# Patient Record
Sex: MALE | Race: WHITE | Employment: FULL TIME | URBAN - METROPOLITAN AREA
[De-identification: names, ages, dates, MRNs, and addresses within clinical notes are randomized per-mention and may not be internally consistent; named-entity substitution may affect disease eponyms.]

---

## 2021-07-01 ENCOUNTER — HOSPITAL ENCOUNTER (EMERGENCY)
Age: 21
Discharge: HOME | End: 2021-07-01
Attending: EMERGENCY MEDICINE
Payer: COMMERCIAL

## 2021-07-01 VITALS
RESPIRATION RATE: 18 BRPM | OXYGEN SATURATION: 98 % | DIASTOLIC BLOOD PRESSURE: 78 MMHG | HEART RATE: 78 BPM | SYSTOLIC BLOOD PRESSURE: 136 MMHG | TEMPERATURE: 97.4 F

## 2021-07-01 DIAGNOSIS — T30.0 BURN: Primary | ICD-10-CM

## 2021-07-01 PROCEDURE — 90471 IMMUNIZATION ADMIN: CPT | Performed by: STUDENT IN AN ORGANIZED HEALTH CARE EDUCATION/TRAINING PROGRAM

## 2021-07-01 PROCEDURE — 99282 EMERGENCY DEPT VISIT SF MDM: CPT

## 2021-07-01 PROCEDURE — 6360000002 HC RX W HCPCS: Performed by: STUDENT IN AN ORGANIZED HEALTH CARE EDUCATION/TRAINING PROGRAM

## 2021-07-01 PROCEDURE — 90715 TDAP VACCINE 7 YRS/> IM: CPT | Performed by: STUDENT IN AN ORGANIZED HEALTH CARE EDUCATION/TRAINING PROGRAM

## 2021-07-01 RX ORDER — BACITRACIN, NEOMYCIN, POLYMYXIN B 400; 3.5; 5 [USP'U]/G; MG/G; [USP'U]/G
OINTMENT TOPICAL
Qty: 1 TUBE | Refills: 0 | Status: SHIPPED | OUTPATIENT
Start: 2021-07-01 | End: 2021-07-11

## 2021-07-01 RX ORDER — BACITRACIN, NEOMYCIN, POLYMYXIN B 400; 3.5; 5 [USP'U]/G; MG/G; [USP'U]/G
OINTMENT TOPICAL
Qty: 1 TUBE | Refills: 0 | Status: SHIPPED | OUTPATIENT
Start: 2021-07-01 | End: 2021-07-01 | Stop reason: SDUPTHER

## 2021-07-01 RX ADMIN — TETANUS TOXOID, REDUCED DIPHTHERIA TOXOID AND ACELLULAR PERTUSSIS VACCINE, ADSORBED 0.5 ML: 5; 2.5; 8; 8; 2.5 SUSPENSION INTRAMUSCULAR at 21:36

## 2021-07-01 ASSESSMENT — ENCOUNTER SYMPTOMS: ROS SKIN COMMENTS: BURN
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
## 2021-07-25 ENCOUNTER — HOSPITAL ENCOUNTER (EMERGENCY)
Age: 21
Discharge: HOME | End: 2021-07-26
Attending: EMERGENCY MEDICINE
Payer: COMMERCIAL

## 2021-07-25 ENCOUNTER — APPOINTMENT (OUTPATIENT)
Dept: GENERAL RADIOLOGY | Age: 21
End: 2021-07-25
Payer: COMMERCIAL

## 2021-07-25 VITALS
SYSTOLIC BLOOD PRESSURE: 126 MMHG | HEART RATE: 93 BPM | HEIGHT: 68 IN | OXYGEN SATURATION: 98 % | TEMPERATURE: 99.1 F | BODY MASS INDEX: 23.49 KG/M2 | DIASTOLIC BLOOD PRESSURE: 67 MMHG | RESPIRATION RATE: 15 BRPM | WEIGHT: 155 LBS

## 2021-07-25 DIAGNOSIS — B34.9 VIRAL ILLNESS: Primary | ICD-10-CM

## 2021-07-25 LAB
SARS-COV-2, RAPID: NOT DETECTED
SPECIMEN DESCRIPTION: NORMAL

## 2021-07-25 PROCEDURE — 6370000000 HC RX 637 (ALT 250 FOR IP): Performed by: EMERGENCY MEDICINE

## 2021-07-25 PROCEDURE — 87635 SARS-COV-2 COVID-19 AMP PRB: CPT

## 2021-07-25 PROCEDURE — 70360 X-RAY EXAM OF NECK: CPT

## 2021-07-25 PROCEDURE — 71046 X-RAY EXAM CHEST 2 VIEWS: CPT

## 2021-07-25 PROCEDURE — 99285 EMERGENCY DEPT VISIT HI MDM: CPT

## 2021-07-25 RX ORDER — BENZONATATE 100 MG/1
100 CAPSULE ORAL ONCE
Status: COMPLETED | OUTPATIENT
Start: 2021-07-25 | End: 2021-07-26

## 2021-07-25 RX ORDER — IBUPROFEN 800 MG/1
800 TABLET ORAL EVERY 8 HOURS PRN
Qty: 21 TABLET | Refills: 0 | Status: SHIPPED | OUTPATIENT
Start: 2021-07-25

## 2021-07-25 RX ORDER — BENZONATATE 100 MG/1
100 CAPSULE ORAL 3 TIMES DAILY PRN
Qty: 21 CAPSULE | Refills: 0 | Status: SHIPPED | OUTPATIENT
Start: 2021-07-25 | End: 2021-08-01

## 2021-07-25 RX ORDER — IBUPROFEN 800 MG/1
800 TABLET ORAL ONCE
Status: COMPLETED | OUTPATIENT
Start: 2021-07-25 | End: 2021-07-25

## 2021-07-25 RX ADMIN — IBUPROFEN 800 MG: 800 TABLET, FILM COATED ORAL at 22:08

## 2021-07-25 ASSESSMENT — PAIN SCALES - GENERAL
PAINLEVEL_OUTOF10: 6

## 2021-07-25 ASSESSMENT — PAIN DESCRIPTION - LOCATION: LOCATION: CHEST

## 2021-07-25 ASSESSMENT — ENCOUNTER SYMPTOMS
RHINORRHEA: 1
VOMITING: 0
NAUSEA: 0
SHORTNESS OF BREATH: 1
SORE THROAT: 1
COUGH: 1
CONSTIPATION: 0
ABDOMINAL PAIN: 0
BACK PAIN: 0
DIARRHEA: 0
PHOTOPHOBIA: 0

## 2021-07-25 ASSESSMENT — PAIN DESCRIPTION - FREQUENCY: FREQUENCY: INTERMITTENT

## 2021-07-25 ASSESSMENT — PAIN DESCRIPTION - DESCRIPTORS: DESCRIPTORS: OTHER (COMMENT)

## 2021-07-26 PROCEDURE — 6370000000 HC RX 637 (ALT 250 FOR IP): Performed by: EMERGENCY MEDICINE

## 2021-07-26 RX ADMIN — BENZONATATE 100 MG: 100 CAPSULE ORAL at 00:06

## 2021-07-26 NOTE — ED NOTES
Pt given written and verbal discharge, f/u instructions with instructions to p/u medications at preferred pharmacy. Pt verbalizes understanding. Pt is ambulatory with steady gait.       Henriette Ormond, RN  07/26/21 0006

## 2021-07-26 NOTE — ED TRIAGE NOTES
Pt reports x 5 days ago, runny nose, with unproductive cough and low grade temp. . Pt reports nausea, no vomiting. Pt reports chills.

## 2021-07-26 NOTE — ED PROVIDER NOTES
[id number] [facility] ED  [address] [facility] [address]  Phone: [phone]  Fax: [phone]        Pt Name: Tena Bloom  MRN: [id number]  [place] 2000  Date of evaluation: 7/25/21      CHIEF COMPLAINT     Chief Complaint   Patient presents with    Cough     x 5 days    Fever     x 5 days          HISTORY OF PRESENT ILLNESS  (Location/Symptom, Timing/Onset, Context/Setting, Quality, Duration, Modifying Factors, Severity.)    Tena Bloom is a 24 y.o. male who presents with cough, shortness of breath, nasal congestion, sore throat, and headache. He has been sick since Wednesday. He states he just got back from vacation in [place]. He complains of pain with eating and drinking. The patient has no prior history of venous thromboembolism. No recent surgery. No leg swelling. No hemoptysis. No estrogen containing medications. No history of malignancy. He did not get the Covid-19 vaccine. No fever. He had chills Wednesday through Friday. He states he feels like he is slightly better. REVIEW OF SYSTEMS    (2-9 systems for level 4, 10 or more for level 5)     Review of Systems   Constitutional: Positive for chills. Negative for fever. HENT: Positive for congestion, rhinorrhea and sore throat. Eyes: Negative for photophobia and visual disturbance. Respiratory: Positive for cough and shortness of breath. Cardiovascular: Negative for chest pain and palpitations. Gastrointestinal: Negative for abdominal pain, constipation, diarrhea, nausea and vomiting. Genitourinary: Negative for dysuria, frequency and urgency. Musculoskeletal: Positive for neck pain. Negative for back pain. Skin: Negative for rash and wound. Neurological: Negative for dizziness and light-headedness. Hematological: Negative for adenopathy. Does not bruise/bleed easily. PAST MEDICAL HISTORY    has a past medical history of Gastroptosis.     SURGICAL HISTORY      has no past surgical history on file. CURRENTMEDICATIONS       Previous Medications    No medications on file       ALLERGIES     has No Known Allergies. FAMILY HISTORY     has no family status information on file. family history is not on file. SOCIAL HISTORY      reports that he has never smoked. He has never used smokeless tobacco. He reports previous alcohol use. He reports that he does not use drugs. PHYSICAL EXAM    (up to 7 for level 4, 8 or more for level 5)   INITIAL VITALS:  height is 5' 8\" (1.727 m) and weight is 70.3 kg (155 lb). His oral temperature is 99.1 °F (37.3 °C). His blood pressure is 126/67 and his pulse is 93. His respiration is 15 and oxygen saturation is 98%. Physical Exam  Vitals and nursing note reviewed. Constitutional:       General: He is not in acute distress. Appearance: Normal appearance. He is not ill-appearing or toxic-appearing. HENT:      Head: Normocephalic and atraumatic. Right Ear: Tympanic membrane, ear canal and external ear normal.      Left Ear: Tympanic membrane, ear canal and external ear normal.      Nose: Congestion present. Mouth/Throat:      Mouth: Mucous membranes are moist.      Pharynx: No oropharyngeal exudate or posterior oropharyngeal erythema. Eyes:      Extraocular Movements: Extraocular movements intact. Pupils: Pupils are equal, round, and reactive to light. Neck:      Comments: No crepitus  Cardiovascular:      Rate and Rhythm: Normal rate and regular rhythm. Pulses: Normal pulses. Heart sounds: Normal heart sounds. No murmur heard. No friction rub. No gallop. Pulmonary:      Effort: Pulmonary effort is normal.      Breath sounds: Normal breath sounds. No wheezing, rhonchi or rales. Abdominal:      General: Abdomen is flat. Bowel sounds are normal.      Palpations: Abdomen is soft. Tenderness: There is no abdominal tenderness. There is no guarding or rebound.    Musculoskeletal:         General: Normal range of motion. Cervical back: Normal range of motion and neck supple. No rigidity or tenderness. Right lower leg: No edema. Left lower leg: No edema. Lymphadenopathy:      Cervical: No cervical adenopathy. Skin:     General: Skin is warm and dry. Capillary Refill: Capillary refill takes less than 2 seconds. Neurological:      Mental Status: He is alert and oriented to person, place, and time. Mental status is at baseline. Psychiatric:         Mood and Affect: Mood normal.         Behavior: Behavior normal.         Thought Content: Thought content normal.         DIFFERENTIAL DIAGNOSIS/ MDM:     The patient presents with viral symptoms including cough and sore throat. However at this time, all vital signs are stable and the patient is not hypoxic or tachypneic. Presenting  symptoms are currently mild and not life, limb or organ threatening. The patient presents with upper respiratory symptoms that are not suggestive in nature of pulmonary embolus, cardiac ischemia, meningitis, epiglottis, airway obstruction or other serious etiology. Given the extremely low risk of these diagnoses further testing and evaluation for these possibilites are not indicated at this time. In my opinion, the patient currently does not have an emergency medical condition as defined by EMTALA. I have stressed to the patient that while their current symptoms may not be severe to them, they are highly contagious and should self quarantine for at least 10 days from the onset of symptoms or 72 hours after resolution of their fever without any antipyretic medications (whichever is shorter). Isolation strategies have been discussed and reinforced to protect their friends and families. I have answered questions asked to the patients satisfaction.  They have been instructed to immediately return to the emergency department if symptoms worsen including but not limited to shortness of breath, chest pain, a feeling of passing out,light headed or dizziness, any neurologic symptoms, abdominal pain, or persistent nausea, vomiting and/or diarrhea. I have stressed the importance of close followup with their primary care physicians via telemedicine. The patient has voiced understanding of these instructions and does not offer any further questions or complaints. The patient understands that at this time there is no evidence for a more malignant underlying process, but the patient also understands that early in the process of an illness or injury, an emergency department workup can be falsely reassuring. Routine discharge counseling was given, and the patient understands that worsening, changing or persistent symptoms should prompt an immediate call or follow up with their primary physician or return to the emergency department. The importance of appropriate follow up was also discussed. I have reviewed the disposition diagnosis with the patient and or their family/guardian. I have answered their questions and given discharge instructions. They voiced understanding of these instructions and did not have any further questions or complaints. DIAGNOSTIC RESULTS     EKG: All EKG's are interpreted by the Emergency Department Physician who either signs or Co-signs this chart in the absence of a cardiologist.    none    RADIOLOGY:        Interpretation per the Radiologist below, if available at the time of this note:    XR NECK SOFT TISSUE    Result Date: 7/25/2021  EXAMINATION: [address] TISSUES 7/25/2021 10:47 pm COMPARISON: None. HISTORY: ORDERING SYSTEM PROVIDED HISTORY: neck pain TECHNOLOGIST PROVIDED HISTORY: neck pain Reason for Exam: Neck pain/ cough x 5 days no hx/sx Acuity: Acute Type of Exam: Initial FINDINGS: No prevertebral swelling. Normal epiglottis. Normal tracheal air column. No radiodense foreign body.      Normal soft tissue neck     XR CHEST (2 VW)    Result Date: 7/25/2021  EXAMINATION: TWO XRAY VIEWS OF THE CHEST 7/25/2021 10:47 pm COMPARISON: None. HISTORY: ORDERING SYSTEM PROVIDED HISTORY: cough TECHNOLOGIST PROVIDED HISTORY: cough Reason for Exam: Neck pain/ cough x 5 days no hx/sx Acuity: Acute Type of Exam: Initial FINDINGS: The lungs are without acute focal process. There is no effusion or pneumothorax. The cardiomediastinal silhouette is without acute process. The osseous structures are without acute process. No acute process. LABS:  Results for orders placed or performed during the hospital encounter of 07/25/21   COVID-19, Rapid    Specimen: Nasopharyngeal Swab   Result Value Ref Range    Specimen Description . NASOPHARYNGEAL SWAB     SARS-CoV-2, Rapid Not Detected Not Detected     Covid negative    EMERGENCY DEPARTMENT COURSE:   Vitals:    Vitals:    07/25/21 2121 07/25/21 2239   BP: (!) 112/51 126/67   Pulse: 104 93   Resp: 17 15   Temp: 99.1 °F (37.3 °C)    TempSrc: Oral    SpO2: 97% 98%   Weight: 70.3 kg (155 lb)    Height: 5' 8\" (1.727 m)      -------------------------  BP: 126/67, Temp: 99.1 °F (37.3 °C), Pulse: 93, Resp: 15      RE-EVALUATION:  11:37 PM EDT  Patient updated on results. Plan for discharge to home. CONSULTS:  none    PROCEDURES:  None    FINAL IMPRESSION      1. Viral illness          DISPOSITION/PLAN   DISPOSITION        CONDITION ON DISPOSITION:   Stable     PATIENT REFERRED TO:    Please call [phone] for a follow up appointment.  I would like for you to be rechecked in 2-3 days          DISCHARGE MEDICATIONS:  New Prescriptions    BENZONATATE (TESSALON PERLES) 100 MG CAPSULE    Take 1 capsule by mouth 3 times daily as needed for Cough    IBUPROFEN (IBU) 800 MG TABLET    Take 1 tablet by mouth every 8 hours as needed for Pain       (Please note that portions of this note were completed with a voicerecognition program.  Efforts were made to edit the dictations but occasionally words are mis-transcribed.)    Lili Marquez MD  Attending Emergency Medicine Physician       Sophia Panda MD  07/25/21 8646